# Patient Record
(demographics unavailable — no encounter records)

---

## 2017-01-20 NOTE — DIAGNOSTIC IMAGING REPORT
ULTRASOUND RIGHT UPPER QUADRANT ABDOMEN



CLINICAL HISTORY: Right upper quadrant abdominal pain.



COMPARISON STUDY: Abdominal CT dated 1/9/2012.



TECHNIQUE: Real-time, grayscale, and color flow sonography of the right upper

quadrant of the abdomen was performed. Images are reviewed in the transverse and

longitudinal planes.



FINDINGS:



Liver: The liver is normal in size and echotexture. There is no intrahepatic

biliary ductal dilatation. The main portal vein is patent.



Gallbladder: The gallbladder is normal in appearance. No gallstones are

identified. There is no gallbladder wall thickening or pericholecystic fluid. A

sonographic Blancas's sign is reportedly absent. The common bile duct measures up

to 0.4 cm in diameter.



Pancreas: Visualized portions of the pancreatic head and body are normal in

appearance.



Right kidney: Survey images of the right kidney demonstrate normal size and

echotexture. There is no hydronephrosis.



Ascites: None.





IMPRESSION: No acute sonographic abnormality is identified in the right upper

quadrant. No gallstones are seen.







Electronically signed by:  Saurabh López M.D.

1/20/2017 7:57 AM



Dictated Date/Time:  1/20/2017 7:55 AM

## 2017-11-17 NOTE — MAMMOGRAPHY REPORT
BILATERAL DIGITAL SCREENING MAMMOGRAM TOMOSYNTHESIS WITH CAD: 11/17/2017

CLINICAL HISTORY: Routine screening.  





TECHNIQUE:  Breast tomosynthesis in addition to standard 2D mammography was performed. Current study 
was also evaluated with a Computer Aided Detection (CAD) system.  



COMPARISON: Comparison is made to exams dated:  9/30/2016 mammogram, 10/6/2015 mammogram, 9/28/2015 m
ammogram, 8/5/2013 mammogram, 8/1/2011 mammogram - [facility], and 1/25/2007.   



BREAST COMPOSITION:  There are scattered areas of fibroglandular density in both breasts.  



FINDINGS:  No suspicious masses, calcifications, or areas of architectural distortion are noted in ei
ther breast. There has been no significant interval change compared to prior exams.  



IMPRESSION:  ACR BI-RADS CATEGORY 1: NEGATIVE

There is no mammographic evidence of malignancy. A 1 year screening mammogram is recommended.  The pa
tient will receive written notification of the results.  





Approximately 10% of breast cancers are not detected with mammography. A negative mammographic report
 should not delay biopsy if a clinically suggestive mass is present.



Yojana Banerjee M.D.          

/:11/17/2017 14:56:39  



Imaging Technologist: Ruma VALDEZ(IRLANDA)(M), [facility]

letter sent: Normal 1/2  

BI-RADS Code: ACR BI-RADS Category 1: Negative

## 2018-01-17 NOTE — EMERGENCY ROOM VISIT NOTE
History


First contact with patient:  21:43


Chief Complaint:  EAR PAIN


Stated Complaint:  EAR INFECTION





History of Present Illness


The patient is a 47 year old female who presents to the Emergency Room with 

complaints of pain in her left ear that is worsening over the past 2 days.  The 

patient initially went to her primary care physician and was started on 

Augmentin.  She has taken 4 total doses of the medication, but states that her 

pain has progressively worsened.  The discomfort is currently rated a 9/10 

without radiation.  She is having difficulty hearing out of the left ear. She 

has noted drainage from the canal.  She is without fever, chills, dizziness, 

neck pain, chest pain, chest tightness, or shortness of breath.  No injury or 

trauma.  Advil and Tylenol have not been improving her pain.





Review of Systems


More than 10 systems were reviewed and otherwise negative with the exception of 

history of present illness.





Past Medical/Surgical History


No chronic medical disease





Family History


No pertinent family history





Social History


Smoking Status:  Never Smoker


Alcohol Use:  none


Marital Status:  


Occupation Status:  employed





Current/Historical Medications


Scheduled


Amoxicillin & Pot Clavulanate (Augmentin 875-125 mg), 1 TAB PO BID





Scheduled PRN


Acetaminophen Tab (Tylenol), 325-650 MG PO UD PRN for Pain


Ibuprofen (Advil), 200-600 MG PO Q4H PRN for Pain





Physical Exam


Vital Signs











  Date Time  Temp Pulse Resp B/P (MAP) Pulse Ox O2 Delivery O2 Flow Rate FiO2


 


1/16/18 22:46  87 20 154/91 99   


 


1/16/18 21:39 36.7 97 18 171/95 96 Room Air  











Physical Exam


VITALS: Vitals are noted on the nurse's note and reviewed by myself.  Vital 

signs stable.


GENERAL: Well-developed, well-nourished, white female who is in moderate 

discomfort secondary to her stated complaint. 


EARS: Right external ear, canal, and tympanic membrane appear normal.  The left 

external ear is normal, however the canal is edematous with white drainage and 

discharge consistent with otitis externa.  No mastoid tenderness.  Tympanic 

membrane was not able to be visualized on the left.


EYES: Pupils equal round and reactive to light and accommodation.  Conjunctivae 

without injection, sclerae without icterus.  Extraocular movements intact.  


NOSE: Patent, turbinates without inflammation or discharge.  


MOUTH: Mucous membranes moist.  Tonsils are not enlarged.  Pharynx without 

erythema, blood, or exudate.  Uvula midline.  Airway patent.   


NECK: Supple without nuchal rigidity.  No lymphadenopathy.  No thyromegaly.  

Cervical spine is nontender.  


HEART: Regular rate and rhythm without murmurs gallops or rubs.


LUNGS: Clear to auscultation bilaterally without wheezes, rales or rhonchi.  No 

retractions or accessory muscle use.





Medical Decision & Procedures


Medications Administered











 Medications


  (Trade)  Dose


 Ordered  Sig/Madi


 Route  Start Time


 Stop Time Status Last Admin


Dose Admin


 


 Oxycodone HCl


  (Roxicodone


 Immediate Rel 5MG


 Home Pack)  1 homepack  UD  ONCE


 PO  1/16/18 22:00


 1/16/18 22:01 DC 1/16/18 22:00


1 HOMEPACK


 


 Oxycodone/


 Acetaminophen


  (Percocet


 5-325mg Tab)  1 tab  NOW  STAT


 PO  1/16/18 21:51


 1/16/18 21:52 DC 1/16/18 22:03


1 TAB











ED Course


Physical exam and history were performed. Nursing notes, EMR, and Medication 

List were personally reviewed. 





Patient appears to have a left otitis externa on physical examination.  The 

patient was given Percocet by mouth here in the department. She was placed onto 

her right side with her left ear facing the ceiling.  A wick was placed into 

the left ear canal, and Cipro otic drops were instilled.  The patient remained 

on her side for 5 minutes, and ultimately did well following this 

administration.  She did not have significant worsening of pain or dizziness.  

The patient will be given Cipro HC otic drops to use for the next week.  She 

will be given a home pack of OxyIR.  She was asked to follow with her primary 

care physician with any ongoing or persistent symptoms.





The chart was completed utilizing Dragon Speech Voice Recognition Software. 

Grammatical errors, random word insertions, pronoun errors, and incomplete 

sentences are an occasional consequence of this system due to software 

limitations, ambient noise, and hardware issues. Any formal questions or 

concerns about the content, text, or information contained within the body of 

this dictation should be directly addressed to the provider for clarification.


.





Medical Decision


Differential diagnosis includes, but is not limited to: Otitis media, otitis 

externa, mastoiditis, sinusitis, eustachian tube dysfunction, viral infection, 

and others





Impression





 Primary Impression:  


 Left otitis externa





Departure Information


Dispostion


Home / Self-Care





Condition


GOOD





Forms


HOME CARE DOCUMENTATION FORM,                                                 

               IMPORTANT VISIT INFORMATION





Patient Instructions


[facility]





Additional Instructions





You were seen and evaluated today on an emergency basis only.  This is not a 

substitute for, or an effort to provide, complete comprehensive medical care.  

It is not possible to recognize and treat all injuries or illnesses in a single 

emergency department visit. For this reason it is recommended that you followup 

with your primary care physician with any ongoing or persistent symptoms.





Use the Cipro HC otic ear drops: Instill 3 drops into affected ear twice daily 

for 7 days.





Prior to instillation, bottle should be warmed by holding in hands for 1 to 2 

minutes; dizziness can occur if a cold suspension is instilled. Shake 

suspension well immediately before using. Patient should lie with affected ear 

upward and remain in this position for 30 to 60 seconds following instillation. 





For baseline pain relief you may alternate ibuprofen and acetaminophen every 4 

hours for pain control. Take 600 mg ibuprofen (Advil) and then 4 hours later 

take 1000 mg acetaminophen (Tylenol). Do not take more than 3000 mg 

acetaminophen in a single day.  





Oxycodone (OxyIR) 5mg (homepack): Take ONE pill every SIX hours for 

breakthrough pain.  Avoid alcohol, operating machinery or dangerous equipment, 

working on ladders or roofs, DRIVING, or situations where being under the 

influence may be dangerous.  It is recommended to use an over-the-counter stool 

softener such as Colace, 100mg twice daily while taking this medication to 

avoid constipation. 





You are welcome to return to the emergency department anytime with new, 

worsening, or concerning symptoms.